# Patient Record
Sex: FEMALE | Race: BLACK OR AFRICAN AMERICAN | Employment: OTHER | URBAN - METROPOLITAN AREA
[De-identification: names, ages, dates, MRNs, and addresses within clinical notes are randomized per-mention and may not be internally consistent; named-entity substitution may affect disease eponyms.]

---

## 2018-03-05 ENCOUNTER — HOSPITAL ENCOUNTER (OUTPATIENT)
Facility: HOSPITAL | Age: 78
Setting detail: OBSERVATION
Discharge: HOME | End: 2018-03-08
Attending: EMERGENCY MEDICINE | Admitting: INTERNAL MEDICINE

## 2018-03-05 ENCOUNTER — APPOINTMENT (EMERGENCY)
Dept: RADIOLOGY | Facility: HOSPITAL | Age: 78
End: 2018-03-05

## 2018-03-05 DIAGNOSIS — R11.2 NAUSEA AND VOMITING, INTRACTABILITY OF VOMITING NOT SPECIFIED, UNSPECIFIED VOMITING TYPE: ICD-10-CM

## 2018-03-05 DIAGNOSIS — K44.9 HIATAL HERNIA: ICD-10-CM

## 2018-03-05 DIAGNOSIS — R94.31 ABNORMAL EKG: ICD-10-CM

## 2018-03-05 DIAGNOSIS — R07.9 CHEST PAIN, UNSPECIFIED TYPE: Primary | ICD-10-CM

## 2018-03-05 DIAGNOSIS — R10.84 GENERALIZED ABDOMINAL PAIN: ICD-10-CM

## 2018-03-05 DIAGNOSIS — R91.1 PULMONARY NODULE: ICD-10-CM

## 2018-03-05 LAB
ALBUMIN SERPL BCP-MCNC: 3.6 G/DL (ref 3.5–5)
ALP SERPL-CCNC: 85 U/L (ref 46–116)
ALT SERPL W P-5'-P-CCNC: 20 U/L (ref 12–78)
ANION GAP SERPL CALCULATED.3IONS-SCNC: 10 MMOL/L (ref 4–13)
ANISOCYTOSIS BLD QL SMEAR: PRESENT
APTT PPP: 27 SECONDS (ref 23–35)
AST SERPL W P-5'-P-CCNC: 21 U/L (ref 5–45)
BASOPHILS # BLD MANUAL: 0 THOUSAND/UL (ref 0–0.1)
BASOPHILS NFR MAR MANUAL: 0 % (ref 0–1)
BILIRUB SERPL-MCNC: 0.41 MG/DL (ref 0.2–1)
BUN SERPL-MCNC: 13 MG/DL (ref 5–25)
CALCIUM SERPL-MCNC: 8.8 MG/DL (ref 8.3–10.1)
CHLORIDE SERPL-SCNC: 102 MMOL/L (ref 100–108)
CO2 SERPL-SCNC: 28 MMOL/L (ref 21–32)
CREAT SERPL-MCNC: 1.17 MG/DL (ref 0.6–1.3)
EOSINOPHIL # BLD MANUAL: 0.42 THOUSAND/UL (ref 0–0.4)
EOSINOPHIL NFR BLD MANUAL: 4 % (ref 0–6)
ERYTHROCYTE [DISTWIDTH] IN BLOOD BY AUTOMATED COUNT: 18.3 % (ref 11.6–15.1)
GFR SERPL CREATININE-BSD FRML MDRD: 44 ML/MIN/1.73SQ M
GLUCOSE SERPL-MCNC: 190 MG/DL (ref 65–140)
HCT VFR BLD AUTO: 36.3 % (ref 34.8–46.1)
HGB BLD-MCNC: 11.8 G/DL (ref 11.5–15.4)
HYPERCHROMIA BLD QL SMEAR: PRESENT
INR PPP: 0.98 (ref 0.86–1.16)
LACTATE SERPL-SCNC: 2 MMOL/L (ref 0.5–2)
LIPASE SERPL-CCNC: 252 U/L (ref 73–393)
LYMPHOCYTES # BLD AUTO: 4.54 THOUSAND/UL (ref 0.6–4.47)
LYMPHOCYTES # BLD AUTO: 43 % (ref 14–44)
MAGNESIUM SERPL-MCNC: 2.2 MG/DL (ref 1.6–2.6)
MCH RBC QN AUTO: 23.6 PG (ref 26.8–34.3)
MCHC RBC AUTO-ENTMCNC: 32.5 G/DL (ref 31.4–37.4)
MCV RBC AUTO: 73 FL (ref 82–98)
MONOCYTES # BLD AUTO: 0.42 THOUSAND/UL (ref 0–1.22)
MONOCYTES NFR BLD: 4 % (ref 4–12)
NEUTROPHILS # BLD MANUAL: 4.85 THOUSAND/UL (ref 1.85–7.62)
NEUTS SEG NFR BLD AUTO: 46 % (ref 43–75)
PLATELET # BLD AUTO: 541 THOUSANDS/UL (ref 149–390)
PLATELET BLD QL SMEAR: ABNORMAL
PMV BLD AUTO: 9.7 FL (ref 8.9–12.7)
POIKILOCYTOSIS BLD QL SMEAR: PRESENT
POLYCHROMASIA BLD QL SMEAR: PRESENT
POTASSIUM SERPL-SCNC: 3.6 MMOL/L (ref 3.5–5.3)
PROT SERPL-MCNC: 7.8 G/DL (ref 6.4–8.2)
PROTHROMBIN TIME: 13 SECONDS (ref 12.1–14.4)
RBC # BLD AUTO: 4.99 MILLION/UL (ref 3.81–5.12)
SODIUM SERPL-SCNC: 140 MMOL/L (ref 136–145)
TOTAL CELLS COUNTED SPEC: 100
TROPONIN I SERPL-MCNC: <0.02 NG/ML
VARIANT LYMPHS # BLD AUTO: 3 %
WBC # BLD AUTO: 10.55 THOUSAND/UL (ref 4.31–10.16)

## 2018-03-05 PROCEDURE — 80053 COMPREHEN METABOLIC PANEL: CPT | Performed by: EMERGENCY MEDICINE

## 2018-03-05 PROCEDURE — 96361 HYDRATE IV INFUSION ADD-ON: CPT

## 2018-03-05 PROCEDURE — 85730 THROMBOPLASTIN TIME PARTIAL: CPT | Performed by: EMERGENCY MEDICINE

## 2018-03-05 PROCEDURE — 96375 TX/PRO/DX INJ NEW DRUG ADDON: CPT

## 2018-03-05 PROCEDURE — 71046 X-RAY EXAM CHEST 2 VIEWS: CPT

## 2018-03-05 PROCEDURE — 96374 THER/PROPH/DIAG INJ IV PUSH: CPT

## 2018-03-05 PROCEDURE — 83690 ASSAY OF LIPASE: CPT | Performed by: NURSE PRACTITIONER

## 2018-03-05 PROCEDURE — 85007 BL SMEAR W/DIFF WBC COUNT: CPT | Performed by: EMERGENCY MEDICINE

## 2018-03-05 PROCEDURE — 93005 ELECTROCARDIOGRAM TRACING: CPT

## 2018-03-05 PROCEDURE — 83605 ASSAY OF LACTIC ACID: CPT | Performed by: NURSE PRACTITIONER

## 2018-03-05 PROCEDURE — 81002 URINALYSIS NONAUTO W/O SCOPE: CPT | Performed by: NURSE PRACTITIONER

## 2018-03-05 PROCEDURE — 85027 COMPLETE CBC AUTOMATED: CPT | Performed by: EMERGENCY MEDICINE

## 2018-03-05 PROCEDURE — 36415 COLL VENOUS BLD VENIPUNCTURE: CPT

## 2018-03-05 PROCEDURE — 84484 ASSAY OF TROPONIN QUANT: CPT | Performed by: EMERGENCY MEDICINE

## 2018-03-05 PROCEDURE — 85610 PROTHROMBIN TIME: CPT | Performed by: EMERGENCY MEDICINE

## 2018-03-05 PROCEDURE — 83735 ASSAY OF MAGNESIUM: CPT | Performed by: NURSE PRACTITIONER

## 2018-03-05 RX ORDER — ONDANSETRON 2 MG/ML
4 INJECTION INTRAMUSCULAR; INTRAVENOUS ONCE
Status: COMPLETED | OUTPATIENT
Start: 2018-03-05 | End: 2018-03-05

## 2018-03-05 RX ORDER — SODIUM CHLORIDE 9 MG/ML
75 INJECTION, SOLUTION INTRAVENOUS CONTINUOUS
Status: DISCONTINUED | OUTPATIENT
Start: 2018-03-05 | End: 2018-03-07

## 2018-03-05 RX ORDER — MORPHINE SULFATE 2 MG/ML
2 INJECTION, SOLUTION INTRAMUSCULAR; INTRAVENOUS ONCE
Status: COMPLETED | OUTPATIENT
Start: 2018-03-05 | End: 2018-03-05

## 2018-03-05 RX ADMIN — ONDANSETRON 4 MG: 2 INJECTION INTRAMUSCULAR; INTRAVENOUS at 22:29

## 2018-03-05 RX ADMIN — MORPHINE SULFATE 2 MG: 2 INJECTION, SOLUTION INTRAMUSCULAR; INTRAVENOUS at 22:57

## 2018-03-05 RX ADMIN — SODIUM CHLORIDE 125 ML/HR: 0.9 INJECTION, SOLUTION INTRAVENOUS at 22:31

## 2018-03-06 ENCOUNTER — APPOINTMENT (EMERGENCY)
Dept: CT IMAGING | Facility: HOSPITAL | Age: 78
End: 2018-03-06

## 2018-03-06 ENCOUNTER — APPOINTMENT (OUTPATIENT)
Dept: ULTRASOUND IMAGING | Facility: HOSPITAL | Age: 78
End: 2018-03-06

## 2018-03-06 PROBLEM — R07.9 CHEST PAIN: Status: ACTIVE | Noted: 2018-03-06

## 2018-03-06 PROBLEM — R11.10 INTRACTABLE VOMITING: Status: ACTIVE | Noted: 2018-03-06

## 2018-03-06 LAB
ALBUMIN SERPL BCP-MCNC: 3 G/DL (ref 3.5–5)
ALP SERPL-CCNC: 73 U/L (ref 46–116)
ALT SERPL W P-5'-P-CCNC: 17 U/L (ref 12–78)
ANION GAP SERPL CALCULATED.3IONS-SCNC: 11 MMOL/L (ref 4–13)
AST SERPL W P-5'-P-CCNC: 16 U/L (ref 5–45)
ATRIAL RATE: 111 BPM
ATRIAL RATE: 81 BPM
ATRIAL RATE: 83 BPM
BASOPHILS # BLD AUTO: 0.02 THOUSANDS/ΜL (ref 0–0.1)
BASOPHILS NFR BLD AUTO: 0 % (ref 0–1)
BILIRUB SERPL-MCNC: 0.68 MG/DL (ref 0.2–1)
BILIRUB UR QL STRIP: NEGATIVE
BUN SERPL-MCNC: 7 MG/DL (ref 5–25)
CALCIUM SERPL-MCNC: 8 MG/DL (ref 8.3–10.1)
CHLORIDE SERPL-SCNC: 107 MMOL/L (ref 100–108)
CLARITY UR: CLEAR
CO2 SERPL-SCNC: 24 MMOL/L (ref 21–32)
COLOR UR: YELLOW
COLOR, POC: YELLOW
CREAT SERPL-MCNC: 1.06 MG/DL (ref 0.6–1.3)
EOSINOPHIL # BLD AUTO: 0.22 THOUSAND/ΜL (ref 0–0.61)
EOSINOPHIL NFR BLD AUTO: 3 % (ref 0–6)
ERYTHROCYTE [DISTWIDTH] IN BLOOD BY AUTOMATED COUNT: 17.8 % (ref 11.6–15.1)
GFR SERPL CREATININE-BSD FRML MDRD: 57 ML/MIN/1.73SQ M
GLUCOSE SERPL-MCNC: 156 MG/DL (ref 65–140)
GLUCOSE UR STRIP-MCNC: NEGATIVE MG/DL
HCT VFR BLD AUTO: 33.8 % (ref 34.8–46.1)
HGB BLD-MCNC: 10.7 G/DL (ref 11.5–15.4)
HGB UR QL STRIP.AUTO: NEGATIVE
KETONES UR STRIP-MCNC: NEGATIVE MG/DL
LEUKOCYTE ESTERASE UR QL STRIP: NEGATIVE
LYMPHOCYTES # BLD AUTO: 3.13 THOUSANDS/ΜL (ref 0.6–4.47)
LYMPHOCYTES NFR BLD AUTO: 41 % (ref 14–44)
MCH RBC QN AUTO: 23.7 PG (ref 26.8–34.3)
MCHC RBC AUTO-ENTMCNC: 31.7 G/DL (ref 31.4–37.4)
MCV RBC AUTO: 75 FL (ref 82–98)
MONOCYTES # BLD AUTO: 0.64 THOUSAND/ΜL (ref 0.17–1.22)
MONOCYTES NFR BLD AUTO: 8 % (ref 4–12)
NEUTROPHILS # BLD AUTO: 3.63 THOUSANDS/ΜL (ref 1.85–7.62)
NEUTS SEG NFR BLD AUTO: 48 % (ref 43–75)
NITRITE UR QL STRIP: NEGATIVE
NRBC BLD AUTO-RTO: 0 /100 WBCS
P AXIS: 54 DEGREES
P AXIS: 58 DEGREES
P AXIS: 69 DEGREES
PH UR STRIP.AUTO: 6 [PH] (ref 4.5–8)
PLATELET # BLD AUTO: 424 THOUSANDS/UL (ref 149–390)
PMV BLD AUTO: 9.7 FL (ref 8.9–12.7)
POTASSIUM SERPL-SCNC: 3.7 MMOL/L (ref 3.5–5.3)
PR INTERVAL: 162 MS
PR INTERVAL: 168 MS
PR INTERVAL: 178 MS
PROT SERPL-MCNC: 6.6 G/DL (ref 6.4–8.2)
PROT UR STRIP-MCNC: NEGATIVE MG/DL
QRS AXIS: -52 DEGREES
QRS AXIS: -72 DEGREES
QRS AXIS: -73 DEGREES
QRSD INTERVAL: 136 MS
QRSD INTERVAL: 138 MS
QRSD INTERVAL: 140 MS
QT INTERVAL: 340 MS
QT INTERVAL: 394 MS
QT INTERVAL: 404 MS
QTC INTERVAL: 457 MS
QTC INTERVAL: 462 MS
QTC INTERVAL: 474 MS
RBC # BLD AUTO: 4.52 MILLION/UL (ref 3.81–5.12)
SODIUM SERPL-SCNC: 142 MMOL/L (ref 136–145)
SP GR UR STRIP.AUTO: 1.01 (ref 1–1.03)
T WAVE AXIS: 21 DEGREES
T WAVE AXIS: 30 DEGREES
T WAVE AXIS: 45 DEGREES
TROPONIN I SERPL-MCNC: <0.02 NG/ML
TROPONIN I SERPL-MCNC: <0.02 NG/ML
UROBILINOGEN UR QL STRIP.AUTO: 1 E.U./DL
VENTRICULAR RATE: 111 BPM
VENTRICULAR RATE: 81 BPM
VENTRICULAR RATE: 83 BPM
WBC # BLD AUTO: 7.64 THOUSAND/UL (ref 4.31–10.16)

## 2018-03-06 PROCEDURE — 74176 CT ABD & PELVIS W/O CONTRAST: CPT

## 2018-03-06 PROCEDURE — 99220 PR INITIAL OBSERVATION CARE/DAY 70 MINUTES: CPT | Performed by: INTERNAL MEDICINE

## 2018-03-06 PROCEDURE — C9113 INJ PANTOPRAZOLE SODIUM, VIA: HCPCS | Performed by: NURSE PRACTITIONER

## 2018-03-06 PROCEDURE — 81003 URINALYSIS AUTO W/O SCOPE: CPT

## 2018-03-06 PROCEDURE — 84484 ASSAY OF TROPONIN QUANT: CPT | Performed by: NURSE PRACTITIONER

## 2018-03-06 PROCEDURE — 80053 COMPREHEN METABOLIC PANEL: CPT | Performed by: INTERNAL MEDICINE

## 2018-03-06 PROCEDURE — 99255 IP/OBS CONSLTJ NEW/EST HI 80: CPT | Performed by: INTERNAL MEDICINE

## 2018-03-06 PROCEDURE — 85025 COMPLETE CBC W/AUTO DIFF WBC: CPT | Performed by: INTERNAL MEDICINE

## 2018-03-06 PROCEDURE — 76705 ECHO EXAM OF ABDOMEN: CPT

## 2018-03-06 PROCEDURE — 71250 CT THORAX DX C-: CPT

## 2018-03-06 PROCEDURE — 84484 ASSAY OF TROPONIN QUANT: CPT | Performed by: INTERNAL MEDICINE

## 2018-03-06 PROCEDURE — 93010 ELECTROCARDIOGRAM REPORT: CPT | Performed by: INTERNAL MEDICINE

## 2018-03-06 PROCEDURE — 96361 HYDRATE IV INFUSION ADD-ON: CPT

## 2018-03-06 PROCEDURE — 99214 OFFICE O/P EST MOD 30 MIN: CPT | Performed by: INTERNAL MEDICINE

## 2018-03-06 PROCEDURE — 93005 ELECTROCARDIOGRAM TRACING: CPT

## 2018-03-06 PROCEDURE — 36415 COLL VENOUS BLD VENIPUNCTURE: CPT | Performed by: NURSE PRACTITIONER

## 2018-03-06 PROCEDURE — 99285 EMERGENCY DEPT VISIT HI MDM: CPT

## 2018-03-06 PROCEDURE — 96375 TX/PRO/DX INJ NEW DRUG ADDON: CPT

## 2018-03-06 RX ORDER — ASPIRIN 81 MG/1
81 TABLET ORAL DAILY
Status: DISCONTINUED | OUTPATIENT
Start: 2018-03-06 | End: 2018-03-06

## 2018-03-06 RX ORDER — PANTOPRAZOLE SODIUM 40 MG/1
40 INJECTION, POWDER, FOR SOLUTION INTRAVENOUS ONCE
Status: COMPLETED | OUTPATIENT
Start: 2018-03-06 | End: 2018-03-06

## 2018-03-06 RX ORDER — ONDANSETRON 2 MG/ML
4 INJECTION INTRAMUSCULAR; INTRAVENOUS EVERY 4 HOURS PRN
Status: DISCONTINUED | OUTPATIENT
Start: 2018-03-06 | End: 2018-03-08 | Stop reason: HOSPADM

## 2018-03-06 RX ORDER — PANTOPRAZOLE SODIUM 40 MG/1
40 TABLET, DELAYED RELEASE ORAL DAILY
COMMUNITY
End: 2018-03-08 | Stop reason: HOSPADM

## 2018-03-06 RX ORDER — ACETAMINOPHEN 325 MG/1
650 TABLET ORAL EVERY 6 HOURS PRN
Status: DISCONTINUED | OUTPATIENT
Start: 2018-03-06 | End: 2018-03-08 | Stop reason: HOSPADM

## 2018-03-06 RX ORDER — AMLODIPINE BESYLATE 10 MG/1
10 TABLET ORAL DAILY
COMMUNITY

## 2018-03-06 RX ORDER — POLYETHYLENE GLYCOL 3350 17 G/17G
17 POWDER, FOR SOLUTION ORAL DAILY
Status: DISCONTINUED | OUTPATIENT
Start: 2018-03-06 | End: 2018-03-08 | Stop reason: HOSPADM

## 2018-03-06 RX ORDER — LOSARTAN POTASSIUM 50 MG/1
25 TABLET ORAL DAILY
COMMUNITY

## 2018-03-06 RX ORDER — PANTOPRAZOLE SODIUM 40 MG/1
40 INJECTION, POWDER, FOR SOLUTION INTRAVENOUS
Status: DISCONTINUED | OUTPATIENT
Start: 2018-03-06 | End: 2018-03-06

## 2018-03-06 RX ORDER — AMLODIPINE BESYLATE 10 MG/1
10 TABLET ORAL DAILY
Status: DISCONTINUED | OUTPATIENT
Start: 2018-03-06 | End: 2018-03-08 | Stop reason: HOSPADM

## 2018-03-06 RX ORDER — OMEPRAZOLE 20 MG/1
20 CAPSULE, DELAYED RELEASE ORAL DAILY
COMMUNITY

## 2018-03-06 RX ADMIN — FAMOTIDINE 20 MG: 10 INJECTION, SOLUTION INTRAVENOUS at 02:20

## 2018-03-06 RX ADMIN — FAMOTIDINE 20 MG: 10 INJECTION, SOLUTION INTRAVENOUS at 20:01

## 2018-03-06 RX ADMIN — FAMOTIDINE 20 MG: 10 INJECTION, SOLUTION INTRAVENOUS at 09:59

## 2018-03-06 RX ADMIN — AMLODIPINE BESYLATE 10 MG: 10 TABLET ORAL at 09:59

## 2018-03-06 RX ADMIN — ASPIRIN 81 MG: 81 TABLET, COATED ORAL at 09:59

## 2018-03-06 RX ADMIN — SODIUM CHLORIDE 75 ML/HR: 0.9 INJECTION, SOLUTION INTRAVENOUS at 21:27

## 2018-03-06 RX ADMIN — ENOXAPARIN SODIUM 40 MG: 40 INJECTION SUBCUTANEOUS at 09:59

## 2018-03-06 RX ADMIN — SODIUM CHLORIDE 125 ML/HR: 0.9 INJECTION, SOLUTION INTRAVENOUS at 10:00

## 2018-03-06 RX ADMIN — PANTOPRAZOLE SODIUM 40 MG: 40 INJECTION, POWDER, FOR SOLUTION INTRAVENOUS at 01:07

## 2018-03-06 NOTE — PLAN OF CARE

## 2018-03-06 NOTE — SOCIAL WORK
CM spoke with pt's daughter via phone to discuss discharge needs  Pt is from [employer] visiting family  Daughter states whenever she wants to go back she can  ADL's are completed independently with no DME use  Pt does not have a PCP or pharmacy here  Pt's daughter has been providing transportation for her while she's here  Pt does not have a POA  No needs at present  CM following as needed

## 2018-03-06 NOTE — H&P
History and Physical - [facility] Internal Medicine    Patient Information: Nabil Carmen [de-identified] y o  female MRN: [id number]  Unit/Bed#: ED 10 Encounter: [id number]  Admitting Physician: Josh Vazquez MD  PCP: No primary care provider on file  Date of Admission:  03/06/18        Chief Complaint:   Abdominal pain and vomiting    History of Present Illness:    Nabil Carmen is a [de-identified] y o  female with past medical history of GERD and hypertension presenting to the ER with abdominal pain and vomiting  Patient is very poor historian, she has Creol speaking even with interpretation she is unable to give clear history  She stated that she is having pain all over her body including her abdomen chest and arms  She had been vomiting but she cannot enumerate how many times or when the vomiting started  She stated that she has been having on and off diarrhea  She did not recognize any fevers  Denies any chest pain, SOB, coughing or palpitations  I could not obtain any further review of systems due to language barrier    Review of Systems:  Review of systems as above, could not obtain any further review of systems due to language barrier    Past Medical and Surgical History:     Past Medical History:   Diagnosis Date    GERD (gastroesophageal reflux disease)     Hypertension        Past Surgical History:   Procedure Laterality Date    NO PAST SURGERIES         Meds/Allergies:    Prior to Admission medications    Medication Sig Start Date End Date Taking? Authorizing Provider   amLODIPine (NORVASC) 10 mg tablet Take 10 mg by mouth daily   Yes Historical Provider, MD     I have reviewed home medications with a medical source (PCP, Pharmacy, other)      Allergies: No Known Allergies    Social History:     Marital Status: Single     History   Alcohol Use No     History   Smoking Status    Never Smoker   Smokeless Tobacco    Never Used     History   Drug Use No       Family History:  Asked and noncontributory    Physical Exam:     Vitals:   Blood Pressure: 129/68 (03/06/18 0107)  Pulse: 77 (03/06/18 0107)  Temperature: 98 2 °F (36 8 °C) (03/05/18 2151)  Temp Source: Temporal (03/05/18 2151)  Respirations: 20 (03/06/18 0107)  SpO2: 97 % (03/06/18 0107)    General: Alert , Ox3  Head: Normocephalic atraumatic  Eyes: SHILPA  Anicteric sclera  H&N: Supple neck  No palpable lymphadenopathy  Chest CTA BL  No wheezing or crackles  Heart: S1, S2 RRR, no murmur  Abd: soft, epigastric tenderness with no rebound tenderness, non distended  Extremities: No oedema  No clubbing or cyanosis  Skin: Intact, no rash  Neuro: Moving all 4 extremities  Additional Data:     Lab Results: I have personally reviewed pertinent reports  Results from last 7 days  Lab Units 03/05/18  2204   WBC Thousand/uL 10 55*   HEMOGLOBIN g/dL 11 8   HEMATOCRIT % 36 3   PLATELETS Thousands/uL 541*   LYMPHO PCT % 43   MONO PCT MAN % 4   EOSINO PCT MANUAL % 4       Results from last 7 days  Lab Units 03/05/18  2204   SODIUM mmol/L 140   POTASSIUM mmol/L 3 6   CHLORIDE mmol/L 102   CO2 mmol/L 28   BUN mg/dL 13   CREATININE mg/dL 1 17   CALCIUM mg/dL 8 8   TOTAL PROTEIN g/dL 7 8   BILIRUBIN TOTAL mg/dL 0 41   ALK PHOS U/L 85   ALT U/L 20   AST U/L 21   GLUCOSE RANDOM mg/dL 190*       Results from last 7 days  Lab Units 03/05/18  2204   INR  0 98       Imaging: I have personally reviewed pertinent reports  Ct Chest Abdomen Pelvis Wo Contrast    Result Date: 3/6/2018  Narrative: CT CHEST, ABDOMEN AND PELVIS WITHOUT IV CONTRAST INDICATION: Abdominal pain, nausea vomiting and diarrhea  COMPARISON: 3/5/2018  TECHNIQUE: CT examination of the chest, abdomen and pelvis was performed without intravenous contrast   Axial, sagittal, and coronal 2D reformatted images were created from the source data and submitted for interpretation  Radiation dose length product (DLP) for this visit:  1468 mGy-cm     This examination, like all CT scans performed in the [facility], was performed utilizing techniques to minimize radiation dose exposure, including the use of iterative reconstruction and automated exposure control  Enteric contrast was administered  FINDINGS: CHEST LUNGS:  There is a 2 mm subpleural lesion in the right middle lobe, adjacent to the fissure on series 3 image 23  A 3 5 mm pulmonary nodule is present in the right middle lobe on series 3 image 28  There is no tracheal or endobronchial lesion  PLEURA:  Unremarkable  HEART/GREAT VESSELS:  Mild cardiomegaly  No thoracic aortic aneurysm  MEDIASTINUM AND JAN:  Moderate to large hiatal hernia  No lymphadenopathy  CHEST WALL AND LOWER NECK:   Unremarkable  ABDOMEN LIVER/BILIARY TREE:  Unremarkable  GALLBLADDER:  Stones or gallbladder  No evidence of cholecystitis    SPLEEN:  Unremarkable  PANCREAS:  Unremarkable  ADRENAL GLANDS:  Unremarkable  KIDNEYS/URETERS:  No nephrolithiasis or obstructive uropathy  STOMACH AND BOWEL:  Fat-containing 2 cm periumbilical hernia  No bowel obstruction, colitis or diverticulitis  APPENDIX:  No findings to suggest appendicitis  ABDOMINOPELVIC CAVITY:  No ascites or free intraperitoneal air  No lymphadenopathy  VESSELS:  Unremarkable for patient's age  PELVIS REPRODUCTIVE ORGANS:  Unremarkable for patient's age  URINARY BLADDER:  Unremarkable  ABDOMINAL WALL/INGUINAL REGIONS:  Unremarkable  OSSEOUS STRUCTURES:  No acute fracture or destructive osseous lesion  Impression: 1  Moderate to large hiatal hernia  No bowel obstruction, colitis or diverticulitis  2   Stone filled gallbladder without evidence of acute cholecystitis  No biliary obstruction  3   Pulmonary nodules in the right middle lobe measuring 2 and 3 5 cm Based on current Fleischner Society 2017 Guidelines on incidental pulmonary nodule, no routine follow-up is needed if the patient is considered low risk for lung cancer    If the patient is considered high risk for lung cancer, 12 month follow-up non-contrast chest CT is recommended  Workstation performed: [id number]       Assessment/Plan:    Hospital Problem List:     Principal Problem:    Intractable vomiting  Active Problems:    Chest pain      Plan for the Primary Problem(s):  Intractable vomiting  Patient with epigastric tenderness suggesting gastritis  Will keep patient on clear liquid diet  Will start her on IV Pepcid and p r n  IV Zofran  Continue Protonix  EKG shows sinus rhythm with bifascicular block  Will consult GI to evaluate for possible EGD    Chest pain  Will keep patient tele monitor  Trend troponins  Check 2D echo  Start patient on aspirin empirically  Cardiology consultation for further recommendations  Hypertension  Will continue Norvasc    VTE Prophylaxis: Enoxaparin (Lovenox)  / sequential compression device   Code Status: fc  POLST: POLST form is not discussed and not completed at this time  Anticipated Length of Stay:  Patient will be admitted on an Observation basis with an anticipated length of stay of  <2 midnights  Justification for Hospital Stay:  Intractable vomiting and workup for chest pain    Total Time for Visit, including Counseling / Coordination of Care: 30 minutes  Greater than 50% of this total time spent on direct patient counseling and coordination of care  ** Please Note: Dragon 360 Dictation voice to text software may have been used in the creation of this document   **

## 2018-03-06 NOTE — PROGRESS NOTES
Cardiology Consult  03/06/18    Referring Physian: DO SHOSHANA Malone    Chief Complain/Reason for Referal:     IMPRESSION:  1  Atypical chest pain, reproducible  2  Abdominal pain and vomiting  3  Bifascicular block  4  Hypertension        DISCUSSION/RECOMMENDATIONS:  · Chest pain clearly reproducible with palpation, may be musculoskeletal etiology secondary to intractable vomiting  No ischemic ECG changes, although nonspecific T-wave abnormalities present in the setting of bifascicular block  Troponins unremarkable  Recommend finishing of balance of cardiac enzymes, echocardiogram to evaluate cardiac structure and function  If unremarkable, would not pursue further inpatient cardiac testing  If echocardiogram unremarkable, we will see as needed from here on     ======================================================    HPI:  I am seeing this patient in cardiology consultation for:  Chest pain    Isaias Castellanos is a [de-identified] y o  female who presented to the ER last night with complaints of intractable nausea and vomiting  CT abdomen revealed a large hiatal hernia  She also complains of chest pain, which triggered a cardiology consultation  Troponin levels have been unremarkable since her stay here with no ischemic ECG changes  She is unable to clarify her chest pain in detail, but seems to be clearly reproducible with changes in position while in bed and with palpation          Past Medical History:   Diagnosis Date    GERD (gastroesophageal reflux disease)     Hypertension          Scheduled Meds:  Current Facility-Administered Medications:  acetaminophen 650 mg Oral Q6H PRN Irineo Harrell MD    amLODIPine 10 mg Oral Daily Irineo Harrell MD    aspirin 81 mg Oral Daily Irineo Harrell MD    enoxaparin 40 mg Subcutaneous Daily Irineo Harrell MD    famotidine 20 mg Intravenous Q12H [address] Irineo Harrell MD    ondansetron 4 mg Intravenous Q4H PRN Irineo Harrell MD    polyethylene glycol 17 g Oral Daily Irineo MD Sharri    sodium chloride 125 mL/hr Intravenous Continuous Carolina JACK Powers Last Rate: 125 mL/hr (03/05/18 2231)     Continuous Infusions:  sodium chloride 125 mL/hr Last Rate: 125 mL/hr (03/05/18 2231)     PRN Meds:   acetaminophen    ondansetron  No Known Allergies  I reviewed the Home Medication list in the chart  History reviewed  No pertinent family history  Social History     Social History    Marital status: Single     Spouse name: N/A    Number of children: N/A    Years of education: N/A     Occupational History    Not on file  Social History Main Topics    Smoking status: Never Smoker    Smokeless tobacco: Never Used    Alcohol use No    Drug use: No    Sexual activity: Not on file     Other Topics Concern    Not on file     Social History Narrative    No narrative on file       Review of Systems - as per HPI, all others reviewed and negative  Vitals:    03/06/18 0755   BP: 118/64   Pulse: 75   Resp: 19   Temp: 98 6 °F (37 °C)   SpO2: 97%     I/O     None        Weight (last 2 days)     None          GEN: NAD, Alert  HEENT: Mucus membranes moist, pink conjunctivae  EYES: Pupils equal, sclera anicteric  NECK: No JVD/HJR, no carotid bruit  CARDIOVASCULAR: RRR, No murmur, rub, gallops S1,S2, no parasternal heave/thrill  LUNGS: Clear To auscultation bilaterally  ABDOMEN: Soft, nondistended, no hepatic systolic pulsation  EXTREMITIES/VASCULAR: No edema    PSYCH: Normal Affect by limited examination  NEURO: Grossly intact by limited examination    HEME: No significant bleeding, bruising, petechia by limited examination  SKIN: No significant rashes by limited examination  MSK:  Normal upper extremity and trunk strengths by limited examination      EKG:  Sinus rhythm, bifascicular block  TELE:  Sinus rhythm, no events      Results from last 7 days  Lab Units 03/05/18  2204   WBC Thousand/uL 10 55*   HEMOGLOBIN g/dL 11 8   HEMATOCRIT % 36 3   PLATELETS Thousands/uL 541*   MONO PCT MAN % 4       Results from last 7 days  Lab Units 03/05/18  2204   SODIUM mmol/L 140   POTASSIUM mmol/L 3 6   CHLORIDE mmol/L 102   CO2 mmol/L 28   BUN mg/dL 13   CREATININE mg/dL 1 17   GLUCOSE RANDOM mg/dL 190*   CALCIUM mg/dL 8 8       Results from last 7 days  Lab Units 03/05/18  2204   SODIUM mmol/L 140   POTASSIUM mmol/L 3 6   CHLORIDE mmol/L 102   CO2 mmol/L 28   BUN mg/dL 13   CREATININE mg/dL 1 17   CALCIUM mg/dL 8 8   TOTAL PROTEIN g/dL 7 8   BILIRUBIN TOTAL mg/dL 0 41   ALK PHOS U/L 85   ALT U/L 20   AST U/L 21   GLUCOSE RANDOM mg/dL 190*     No results found for: TROPONINT        Results from last 7 days  Lab Units 03/06/18  0400   TROPONIN I ng/mL <0 02           Results from last 7 days  Lab Units 03/05/18  2204   INR  0 98               I have personally reviewed the EKG, CXR and Telemetry images directly  Patient Active Problem List    Diagnosis Date Noted    Chest pain 03/06/2018     Priority: Low    Intractable vomiting 03/06/2018     Priority: Low       Portions of the record may have been created with voice recognition software  Occasional wrong word or "sound a like" substitutions may have occurred due to the inherent limitations of voice recognition software  Read the chart carefully and recognize, using context, where substitutions have occurred

## 2018-03-06 NOTE — ED PROVIDER NOTES
History  Chief Complaint   Patient presents with    Vomiting     vomiting since yest, reports upper chest pain and sob  This is an [de-identified]year old [address] women who speaks no English and through family member states she started with nausea, vomiting, abdominal pain, chest pain last night  Pt has had intermittent diarrhea  She was taking zofran but stopped  Family member denies that pt has had any abdominal surgeries  History provided by:  Patient and medical records  History limited by: culture    used: Yes        Prior to Admission Medications   Prescriptions Last Dose Informant Patient Reported? Taking? amLODIPine (NORVASC) 10 mg tablet   Yes Yes   Sig: Take 10 mg by mouth daily   cimetidine (TAGAMET) 200 mg tablet   Yes Yes   Sig: Take 200 mg by mouth 4 (four) times a day   losartan (COZAAR) 50 mg tablet   Yes Yes   Sig: Take 25 mg by mouth daily   omeprazole (PriLOSEC) 20 mg delayed release capsule   Yes Yes   Sig: Take 20 mg by mouth daily   pantoprazole (PROTONIX) 40 mg tablet   Yes Yes   Sig: Take 40 mg by mouth daily      Facility-Administered Medications: None       Past Medical History:   Diagnosis Date    GERD (gastroesophageal reflux disease)     Hypertension        Past Surgical History:   Procedure Laterality Date    NO PAST SURGERIES         History reviewed  No pertinent family history  I have reviewed and agree with the history as documented  Social History   Substance Use Topics    Smoking status: Never Smoker    Smokeless tobacco: Never Used    Alcohol use No        Review of Systems   Constitutional: Negative  HENT: Negative  Eyes: Negative  Respiratory: Negative  Cardiovascular: Positive for chest pain  Gastrointestinal: Positive for abdominal pain, diarrhea, nausea and vomiting  Endocrine: Negative  Genitourinary: Negative  Musculoskeletal: Negative  Skin: Negative  Allergic/Immunologic: Negative      Neurological: Negative  Hematological: Negative  Psychiatric/Behavioral: Negative  Physical Exam  ED Triage Vitals   Temperature Pulse Respirations Blood Pressure SpO2   03/05/18 2151 03/05/18 2151 03/05/18 2151 03/05/18 2151 03/05/18 2151   98 2 °F (36 8 °C) (!) 122 (!) 24 140/65 98 %      Temp Source Heart Rate Source Patient Position - Orthostatic VS BP Location FiO2 (%)   03/05/18 2151 03/05/18 2242 03/05/18 2151 03/05/18 2151 --   Temporal Monitor Lying Left arm       Pain Score       03/05/18 2151       Worst Possible Pain           Orthostatic Vital Signs  Vitals:    03/05/18 2151 03/05/18 2242 03/05/18 2355 03/06/18 0107   BP: 140/65 129/72 126/73 129/68   Pulse: (!) 122 98 85 77   Patient Position - Orthostatic VS: Lying Sitting Lying Lying       Physical Exam   Constitutional: She is oriented to person, place, and time  She appears well-developed and well-nourished  She appears distressed  HENT:   Head: Normocephalic and atraumatic  Eyes: EOM are normal  Pupils are equal, round, and reactive to light  Neck: Normal range of motion  Neck supple  Cardiovascular: Normal rate, regular rhythm and normal heart sounds  Pulmonary/Chest: Effort normal and breath sounds normal    Abdominal: Soft  Bowel sounds are normal  She exhibits no distension  There is tenderness  Musculoskeletal: Normal range of motion  Neurological: She is alert and oriented to person, place, and time  Skin: Skin is warm and dry  Capillary refill takes less than 2 seconds  She is not diaphoretic  Psychiatric: She has a normal mood and affect  Her behavior is normal  Judgment and thought content normal    Nursing note and vitals reviewed        ED Medications  Medications   sodium chloride 0 9 % infusion (125 mL/hr Intravenous New Bag 3/5/18 2231)   aspirin (ECOTRIN LOW STRENGTH) EC tablet 81 mg (not administered)   famotidine (PEPCID) injection 20 mg (20 mg Intravenous Given 3/6/18 0220)   ondansetron (ZOFRAN) injection 4 mg (4 mg Intravenous Given 3/5/18 2229)   morphine injection 2 mg (2 mg Intravenous Given 3/5/18 2257)   pantoprazole (PROTONIX) injection 40 mg (40 mg Intravenous Given 3/6/18 0107)       Diagnostic Studies  Results Reviewed     Procedure Component Value Units Date/Time    Troponin I [[id number]]     Lab Status:  No result Specimen:  Blood     Troponin I [[id number]]     Lab Status:  No result Specimen:  Blood     Troponin I [[id number]]  (Normal) Collected:  03/06/18 0101    Lab Status:  Final result Specimen:  Blood from Arm, Right Updated:  03/06/18 0124     Troponin I <0 02 ng/mL     Narrative:         Siemens Chemistry analyzer 99% cutoff is > 0 04 ng/mL in network labs    o cTnI 99% cutoff is useful only when applied to patients in the clinical setting of myocardial ischemia  o cTnI 99% cutoff should be interpreted in the context of clinical history, ECG findings and possibly cardiac imaging to establish correct diagnosis  o cTnI 99% cutoff may be suggestive but clearly not indicative of a coronary event without the clinical setting of myocardial ischemia      POCT urinalysis dipstick [[id number]]  (Normal) Resulted:  03/06/18 0016    Lab Status:  Final result Specimen:  Urine Updated:  03/06/18 0016     Color, UA yellow    ED Urine Macroscopic [[id number]]  (Normal) Collected:  03/06/18 0017    Lab Status:  Final result Specimen:  Urine Updated:  03/06/18 0016     Color, UA Yellow     Clarity, UA Clear     pH, UA 6 0     Leukocytes, UA Negative     Nitrite, UA Negative     Protein, UA Negative mg/dl      Glucose, UA Negative mg/dl      Ketones, UA Negative mg/dl      Urobilinogen, UA 1 0 E U /dl      Bilirubin, UA Negative     Blood, UA Negative     Specific Gravity, UA 1 010    Narrative:       CLINITEK RESULT    Magnesium [[id number]]  (Normal) Collected:  03/05/18 2204    Lab Status:  Final result Specimen:  Blood from Arm, Right Updated:  03/05/18 2316     Magnesium 2 2 mg/dL     Lipase [[id number]]  (Normal) Collected: 03/05/18 2204    Lab Status:  Final result Specimen:  Blood from Arm, Right Updated:  03/05/18 2316     Lipase 252 u/L     CBC and differential [[id number]]  (Abnormal) Collected:  03/05/18 2204    Lab Status:  Final result Specimen:  Blood from Arm, Right Updated:  03/05/18 2259     WBC 10 55 (H) Thousand/uL      RBC 4 99 Million/uL      Hemoglobin 11 8 g/dL      Hematocrit 36 3 %      MCV 73 (L) fL      MCH 23 6 (L) pg      MCHC 32 5 g/dL      RDW 18 3 (H) %      MPV 9 7 fL      Platelets 897 (H) Thousands/uL     Lactic acid, plasma [[id number]]  (Normal) Collected:  03/05/18 2231    Lab Status:  Final result Specimen:  Blood from Arm, Right Updated:  03/05/18 2257     LACTIC ACID 2 0 mmol/L     Narrative:         Result may be elevated if tourniquet was used during collection  Troponin I [[id number]]  (Normal) Collected:  03/05/18 2204    Lab Status:  Final result Specimen:  Blood from Arm, Right Updated:  03/05/18 2234     Troponin I <0 02 ng/mL     Narrative:         Siemens Chemistry analyzer 99% cutoff is > 0 04 ng/mL in network labs    o cTnI 99% cutoff is useful only when applied to patients in the clinical setting of myocardial ischemia  o cTnI 99% cutoff should be interpreted in the context of clinical history, ECG findings and possibly cardiac imaging to establish correct diagnosis  o cTnI 99% cutoff may be suggestive but clearly not indicative of a coronary event without the clinical setting of myocardial ischemia      Comprehensive metabolic panel [[id number]]  (Abnormal) Collected:  03/05/18 2204    Lab Status:  Final result Specimen:  Blood from Arm, Right Updated:  03/05/18 2232     Sodium 140 mmol/L      Potassium 3 6 mmol/L      Chloride 102 mmol/L      CO2 28 mmol/L      Anion Gap 10 mmol/L      BUN 13 mg/dL      Creatinine 1 17 mg/dL      Glucose 190 (H) mg/dL      Calcium 8 8 mg/dL      AST 21 U/L      ALT 20 U/L      Alkaline Phosphatase 85 U/L      Total Protein 7 8 g/dL      Albumin 3 6 g/dL Total Bilirubin 0 41 mg/dL      eGFR 44 ml/min/1 73sq m     Narrative:         National Kidney Disease Education Program recommendations are as follows:  GFR calculation is accurate only with a steady state creatinine  Chronic Kidney disease less than 60 ml/min/1 73 sq  meters  Kidney failure less than 15 ml/min/1 73 sq  meters  Josef Pulido [[id number]]  (Normal) Collected:  03/05/18 2204    Lab Status:  Final result Specimen:  Blood from Arm, Right Updated:  03/05/18 2229     Protime 13 0 seconds      INR 0 98    APTT [[id number]]  (Normal) Collected:  03/05/18 2204    Lab Status:  Final result Specimen:  Blood from Arm, Right Updated:  03/05/18 2229     PTT 27 seconds     Narrative: Therapeutic Heparin Range = 60-90 seconds                 X-ray chest 2 views   ED Interpretation by JACK Matos (03/06 0142)   Preliminary reading   Cardiomegaly   Waiting for rad read       CT chest abdomen pelvis wo contrast   Final Result by Rex Chicas MD (03/06 0042)         1  Moderate to large hiatal hernia  No bowel obstruction, colitis or diverticulitis  2   Stone filled gallbladder without evidence of acute cholecystitis  No biliary obstruction  3   Pulmonary nodules in the right middle lobe measuring 2 and 3 5 cm Based on current Fleischner Society 2017 Guidelines on incidental pulmonary nodule, no routine follow-up is needed if the patient is considered low risk for lung cancer  If the    patient is considered high risk for lung cancer, 12 month follow-up non-contrast chest CT is recommended                    Workstation performed: [id number]                    Procedures  ECG 12 Lead Documentation  Date/Time: 3/5/2018 10:28 PM  Performed by: Tonie Garcia  Authorized by: Tonie Garcia     Indications / Diagnosis:  Chest pain, abdominal pain   ECG reviewed by me, the ED Provider: yes (Dr Ira Marquez )    Patient location:  ED and bedside  Previous ECG:     Previous ECG:  Unavailable Comparison to cardiac monitor: Yes    Interpretation:     Interpretation: abnormal    Rate:     ECG rate:  111    ECG rate assessment: tachycardic    Rhythm:     Rhythm: sinus tachycardia and other rhythm      ECG 12 Lead Documentation  Date/Time: 3/6/2018 1:07 AM  Performed by: Maddy Snow  Authorized by: Leonora Mauro     Indications / Diagnosis:  Chest pain   ECG reviewed by me, the ED Provider: yes (Dr Jacky Cabot )    Patient location:  ED and bedside  Previous ECG:     Previous ECG:  Compared to current    Similarity:  No change    Comparison to cardiac monitor: Yes    Interpretation:     Interpretation: abnormal    Rate:     ECG rate:  81  RBBB LAFB     ECG rate assessment: normal    Rhythm:     Rhythm: sinus rhythm and other rhythm             Phone Contacts  ED Phone Contact    ED Course  ED Course as of Mar 06 0346   Tue Mar 06, 2018   0015 Labs reviewed and discussed with pt and family member  Pt appears to be improved, she was up walking to the bathroom and in no acute distress  0050 CT A/P  Impression:  1   Moderate to large hiatal hernia   No bowel obstruction, colitis or diverticulitis  2   Stone filled gallbladder without evidence of acute cholecystitis   No biliary obstruction  3   Pulmonary nodules in the right middle lobe measuring 2 and 3 5 cm Based on current Fleischner Society 2017 Guidelines on incidental pulmonary nodule, no routine follow-up is needed if the patient is considered low risk for lung cancer   If the   patient is considered high risk for lung cancer, 12 month follow-up non-contrast chest CT is recommended  0050 Will start pt on omeprazole due to [place] as pt pain appears to be related to that  Will repeat trop and EKG at 0100 to rule out cardiac pathology  56 Son has left and pt does not understand [place]  Will call son as he left his number, will call after Trop and EKG  0125 Trop#2 < 0 02  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, LAFB   Pt has no follow up and no cardiologist  Discussed with my attending Dr Tammie Lau who states pt should be admitted to [facility] with cardiology consult  Will call [facility]       0052 Spoke with Dr Hanh Lei who states he does not feel pt meets admission criteria  I explain I have spoken with my attending and with abnormal EKG pt may benefit from Cardiology consult and Echo  Dr Hanh Lei states "have your attending call me if he has concerns"  5027 Dr Tammie Lau spoke with Dr Hanh Lei after reassessing pt  [facility] will take patient             HEART Risk Score    Flowsheet Row Most Recent Value   History  1 Filed at: 03/06/2018 0126   ECG  1 Filed at: 03/06/2018 0126   Age  2 Filed at: 03/06/2018 0126   Risk Factors  1 Filed at: 03/06/2018 0126   Troponin  0 Filed at: 03/06/2018 0126   Heart Score Risk Calculator   History  1 Filed at: 03/06/2018 0126   ECG  1 Filed at: 03/06/2018 0126   Age  2 Filed at: 03/06/2018 0126   Risk Factors  1 Filed at: 03/06/2018 0126   Troponin  0 Filed at: 03/06/2018 0126   HEART Score  5 Filed at: 03/06/2018 0126   HEART Score  5 Filed at: 03/06/2018 0126                            MDM  Number of Diagnoses or Management Options  Diagnosis management comments: Differential diagnosis:   MI, NSTEMI, STEMI, viral illness, GERD, Bowel obstruction, gastroenteritis     EKG  Tele  CXR  Labs  IVF  CT C/A/P   Pain control  UA        Amount and/or Complexity of Data Reviewed  Clinical lab tests: ordered and reviewed  Tests in the radiology section of CPT®: ordered and reviewed  Review and summarize past medical records: yes  Discuss the patient with other providers: (Dr Tammie Lau )      CritCare Time    Disposition  Final diagnoses:   Chest pain, unspecified type   Nausea and vomiting, intractability of vomiting not specified, unspecified vomiting type   Generalized abdominal pain   Abnormal EKG   Pulmonary nodule   Hiatal hernia     Time reflects when diagnosis was documented in both MDM as applicable and the Disposition within this note     Time User Action Codes Description Comment    3/6/2018  1:45 AM Bell Mandes Add [R07 9] Chest pain, unspecified type     3/6/2018  1:45 AM Bell Mandes Add [R11 2] Nausea and vomiting, intractability of vomiting not specified, unspecified vomiting type     3/6/2018  1:45 AM Bell Mandes Add [R10 84] Generalized abdominal pain     3/6/2018  1:45 AM Bell Mandes Add [R94 31] Abnormal EKG     3/6/2018  1:46 AM Bell Mandes Add [R91 1] Pulmonary nodule     3/6/2018  1:46 AM Bell Mandes Add [K44 9] Hiatal hernia       ED Disposition     ED Disposition Condition Comment    Admit  Case was discussed with SOHSHANA and the patient's admission status was agreed to be Admission Status: observation status to the service of Dr Muna Torres     Follow-up Information    None       Patient's Medications   Discharge Prescriptions    No medications on file     No discharge procedures on file      ED Provider  Electronically Signed by           Faiza Barrett  03/06/18 7917

## 2018-03-06 NOTE — CASE MANAGEMENT
Initial Clinical Review    Admission: Date/Time/Statement:  OBS 3/6 @ 0149    Orders Placed This Encounter   Procedures    Place in Observation     Standing Status:   Standing     Number of Occurrences:   1     Order Specific Question:   Admitting Physician     Answer:   Matthew Renner [[id number]]     Order Specific Question:   Level of Care     Answer:   Med Surg [16]    Place in Observation (expected length of stay for this patient is less than two midnights)     Standing Status:   Standing     Number of Occurrences:   1     Order Specific Question:   Admitting Physician     Answer:   Matthew Renner [[id number]]     Order Specific Question:   Level of Care     Answer:   Med Surg [16]       ED: Date/Time/Mode of Arrival:   ED Arrival Information     Expected Arrival Acuity Means of Arrival Escorted By Service Admission Type    - 3/5/2018 21:42 Emergent Wheelchair Family Member General Medicine Emergency    Arrival Complaint    Vomiting        Chief Complaint:   Chief Complaint   Patient presents with    Vomiting     vomiting since yest, reports upper chest pain and sob  History of Illness:  [de-identified]year old [address] women who speaks no English and through family member states she started with nausea, vomiting, abdominal pain, chest pain last night  Pt has had intermittent diarrhea  She was taking zofran but stopped     Family member denies that pt has had any abdominal surgeries          ED Vital Signs:   ED Triage Vitals   Temperature Pulse Respirations Blood Pressure SpO2   03/05/18 2151 03/05/18 2151 03/05/18 2151 03/05/18 2151 03/05/18 2151   98 2 °F (36 8 °C) (!) 122 (!) 24 140/65 98 %      Temp Source Heart Rate Source Patient Position - Orthostatic VS BP Location FiO2 (%)   03/05/18 2151 03/05/18 2242 03/05/18 2151 03/05/18 2151 --   Temporal Monitor Lying Left arm       Pain Score       03/05/18 2151       Worst Possible Pain        Wt Readings from Last 1 Encounters:   No data found for Wt       Abnormal Labs/Diagnostic Test Results:  Wbc's 10 55,   Plats 541,   Glu 190  CXR: Cardiomegaly   Low lung volumes   No focal infiltrate or pleural effusion  CT Chest/abd/pelvis:  1   Moderate to large hiatal hernia   No bowel obstruction, colitis or diverticulitis  2   Stone filled gallbladder without evidence of acute cholecystitis   No biliary obstruction  3   Pulmonary nodules in the right middle lobe measuring 2 and 3 5 cm  EKG: Sinus Tach  Right bundle branch block  Left anterior fascicular block      ED Treatment:   Medication Administration from 03/05/2018 2142 to 03/06/2018 0449       Date/Time Order Dose Route Action Action by Comments     03/05/2018 2231 sodium chloride 0 9 % infusion 125 mL/hr Intravenous [address] Renate Fernandez RN      03/05/2018 2229 ondansetron (ZOFRAN) injection 4 mg 4 mg Intravenous Given Renate Fernandez RN      03/05/2018 2257 morphine injection 2 mg 2 mg Intravenous Given Renate Fernandez RN      03/06/2018 0107 pantoprazole (PROTONIX) injection 40 mg 40 mg Intravenous Given Renate Fernandez RN      03/06/2018 0220 famotidine (PEPCID) injection 20 mg 20 mg Intravenous Given Renate Fernandez RN           Past Medical/Surgical History:    Active Ambulatory Problems     Diagnosis Date Noted    No Active Ambulatory Problems     Resolved Ambulatory Problems     Diagnosis Date Noted    No Resolved Ambulatory Problems     Past Medical History:   Diagnosis Date    GERD (gastroesophageal reflux disease)     Hypertension        Admitting Diagnosis: Hiatal hernia [K44 9]  Vomiting [R11 10]  Generalized abdominal pain [R10 84]  Abnormal EKG [R94 31]  Pulmonary nodule [R91 1]  Chest pain, unspecified type [R07 9]  Nausea and vomiting, intractability of vomiting not specified, unspecified vomiting type [R11 2]    Age/Sex: [de-identified] y o  female    Assessment/Plan:   Principal Problem:    Intractable vomiting  Active Problems:    Chest pain     Plan for the Primary Problem(s):  Intractable vomiting  Patient with epigastric tenderness suggesting gastritis  Will keep patient on clear liquid diet  Will start her on IV Pepcid and p r n  IV Zofran  Continue Protonix  EKG shows sinus rhythm with bifascicular block  Will consult GI to evaluate for possible EGD     Chest pain  Will keep patient tele monitor  Trend troponins  Check 2D echo  Start patient on aspirin empirically  Cardiology consultation for further recommendations  Hypertension  Will continue Norvasc     VTE Prophylaxis: Enoxaparin (Lovenox)  / sequential compression device   Code Status: fc  POLST: POLST form is not discussed and not completed at this time      Anticipated Length of Stay:  Patient will be admitted on an Observation basis with an anticipated length of stay of  <2 midnights  Justification for Hospital Stay:  Intractable vomiting and workup for chest pain    Admission Orders:  OBS  TELE    Cont trops x 3  ECHO  Consult Cardio  Consult GI  Clear liquids  SCD's    Scheduled Meds:   Current Facility-Administered Medications:  acetaminophen 650 mg Oral Q6H PRN Irineo Harrell MD    amLODIPine 10 mg Oral Daily Irineo Harrell MD    aspirin 81 mg Oral Daily Irineo Harrell MD    enoxaparin 40 mg Subcutaneous Daily Irineo Harrell MD    famotidine 20 mg Intravenous Q12H [address] Irineo Harrell MD    ondansetron 4 mg Intravenous Q4H PRN Irineo Harrell MD    polyethylene glycol 17 g Oral Daily Irineo Harrell MD    sodium chloride 125 mL/hr Intravenous Continuous JACK Matos Last Rate: 125 mL/hr (03/05/18 2231)     Continuous Infusions:   sodium chloride 125 mL/hr Last Rate: 125 mL/hr (03/05/18 2231)     PRN Meds:   acetaminophen    ondansetron  trops neg    Thank you,  [address] [place] [address] in the [facility] by Chadwick Fitzpatrick for 2017  Network Utilization Review Department  Phone: [phone];  Fax [phone]  ATTENTION: The Network Utilization Review Department is now centralized for our 7 Facilities  Make a note that we have a new phone and fax numbers for our Department  Please call with any questions or concerns to [phone] and carefully follow the prompts so that you are directed to the right person  All voicemails are confidential  Fax any determinations, approvals, denials, and requests for initial or continue stay review clinical to [phone]  Due to HIGH CALL volume, it would be easier if you could please send faxed requests to expedite your requests and in part, help us provide discharge notifications faster

## 2018-03-06 NOTE — TREATMENT PLAN
Ultrasound report pending  Echocardiogram pending  I attempted to contact the patient's family number listed, no answer

## 2018-03-06 NOTE — ED ATTENDING ATTESTATION
Meena Parson MD, saw and evaluated the patient  I have discussed the patient with the resident/non-physician practitioner and agree with the resident's/non-physician practitioner's findings, Plan of Care, and MDM as documented in the resident's/non-physician practitioner's note, except where noted  All available labs and Radiology studies were reviewed  At this point I agree with the current assessment done in the Emergency Department  I have conducted an independent evaluation of this patient a history and physical is as follows:  [de-identified] yo female c/o chest pain, mid epigastric and substernal, since last night, (24 hours), associated with nausea, vomiting  I was involved in the case after her son who was the most helpful  for [facility] had gone home, but even now she continues to clutch her chest and c/o pain  VS have normalized after ED workup   EKG on arrival c/w RBBB, LBBB, without comparison  ED workup was appropriate including troponin, CT chest, which revealed hiatal hernia  Abd soft, seems to localize tenderness to epigastric area  Lipase negative  At this point, the consideration is this is more GI in origin on the continuum of gastritis/PUD, but I agree with admission to complete serial troponins, and to confirm that she can tolerate po without additional complications          Critical Care Time  CritCare Time    Procedures

## 2018-03-06 NOTE — ED NOTES
Patient transported to [address] [place] [address] Yamila, DEVANG  03/06/18 3911

## 2018-03-06 NOTE — ED NOTES
Spoke to Dr Cristin Armenta about Troponin order, made aware a troponin just resulted at 0124, per Dr Cristin Armenta don't do the "Now Troponin" , do the next troponin due in 3 hours        Kayla Denver, RN  03/06/18 0286

## 2018-03-06 NOTE — CONSULTS
Consultation - [facility]  Claudia Roth [de-identified] y o  female MRN: [id number]  Unit/Bed#: [place] [facility] 2 -01 Encounter: [id number]        ASSESSMENT/PLAN:   1  Nausea/vomiting/epigastric pain   - she reports acute onset of nausea, vomiting and epigastric pain which have improved today  She continues to have some epigastric tenderness  She reports pain all over her abdomen, chest and arms  She is unclear regarding RUQ tenderness  - her daughter reports that she had a similar episode in the past and was diagnosed with gallstone disease however surgery was not pursued at that time  -  Her liver function tests were within normal limits, she is afebrile without leukocytosis and her abdominal exam is benign    -  Her history is not consistent passed stone however gallstones were noted on abdominal CT, no evidence of cholecystitis or biliary dilatation was noted  -  Ordered right upper quadrant ultrasound to better assess her gallbladder    -  If this is unremarkable, consider upper endoscopy to assess for peptic ulcer disease  Her hemoglobin is stable and she does not have signs of GI bleeding    -  Continue supportive care and clear liquid diet    Inpatient consult to gastroenterology  Consult performed by: Rupinder Waite  Consult ordered by: Ricky Ruiz          Reason for Consult / Principal Problem: Intractable vomiting    HPI: Claudia Roth is a [de-identified]y o  year old female from [place] presents to the ED for abdominal pain and vomiting  Her daughter is with her and helps to provide some of the history  She reports that her symptoms began acutely couple of days ago with nausea, vomiting and epigastric tenderness  She denies any fevers, chills, difficulty swallowing, early satiety, change in bowel habits, unintended weight loss, hematochezia, melena, hematemesis or jaundice    Her daughter reports that a similar episode happened in the past many years ago and at that time she was diagnosed with gallstone disease and was recommended to see a surgeon  However her symptoms improved and they decided against surgery  Today she is feeling improved and has not had any further vomiting  Her daughter states that she has never had a colonoscopy or upper endoscopy  Review of Systems: as per HPI  Review of Systems   Constitutional: Negative for activity change, appetite change, chills, fatigue, fever and unexpected weight change  HENT: Negative for mouth sores, sore throat and trouble swallowing  Respiratory: Negative for shortness of breath  Cardiovascular: Negative for chest pain  Gastrointestinal: Positive for abdominal pain, nausea and vomiting  Negative for abdominal distention, blood in stool, constipation and diarrhea  Skin: Negative for color change, pallor, rash and wound  Neurological: Negative for tremors and syncope  All other systems reviewed and are negative  Historical Information   Past Medical History:   Diagnosis Date    GERD (gastroesophageal reflux disease)     Hypertension      Past Surgical History:   Procedure Laterality Date    NO PAST SURGERIES       Social History   History   Alcohol Use No     History   Drug Use No     History   Smoking Status    Never Smoker   Smokeless Tobacco    Never Used     History reviewed  No pertinent family history      Meds/Allergies     Prescriptions Prior to Admission   Medication    amLODIPine (NORVASC) 10 mg tablet    cimetidine (TAGAMET) 200 mg tablet    losartan (COZAAR) 50 mg tablet    omeprazole (PriLOSEC) 20 mg delayed release capsule    pantoprazole (PROTONIX) 40 mg tablet     Current Facility-Administered Medications   Medication Dose Route Frequency    acetaminophen (TYLENOL) tablet 650 mg  650 mg Oral Q6H PRN    amLODIPine (NORVASC) tablet 10 mg  10 mg Oral Daily    aspirin (ECOTRIN LOW STRENGTH) EC tablet 81 mg  81 mg Oral Daily    enoxaparin (LOVENOX) subcutaneous injection 40 mg  40 mg Subcutaneous Daily    famotidine (PEPCID) injection 20 mg  20 mg Intravenous Q12H [facility]    ondansetron (ZOFRAN) injection 4 mg  4 mg Intravenous Q4H PRN    polyethylene glycol (MIRALAX) packet 17 g  17 g Oral Daily    sodium chloride 0 9 % infusion  125 mL/hr Intravenous Continuous       No Known Allergies    Objective     Blood pressure 118/64, pulse 75, temperature 98 6 °F (37 °C), temperature source Tympanic, resp  rate 19, SpO2 97 %  No intake or output data in the 24 hours ending 03/06/18 1151    PHYSICAL EXAM     Physical Exam   Constitutional: She is oriented to person, place, and time  She is cooperative  No distress  Obese   HENT:   Head: Normocephalic and atraumatic  Eyes: Right eye exhibits no discharge  Left eye exhibits no discharge  No scleral icterus  Neck: Neck supple  No tracheal deviation present  Cardiovascular: Normal rate, regular rhythm and normal heart sounds  Exam reveals no gallop and no friction rub  No murmur heard  Pulmonary/Chest: Effort normal and breath sounds normal  No respiratory distress  She has no wheezes  She has no rales  Abdominal: Soft  Bowel sounds are normal  She exhibits no distension and no mass  There is tenderness (epigastric)  There is no rebound and no guarding  Neurological: She is alert and oriented to person, place, and time  Skin: Skin is warm and dry  Psychiatric: She has a normal mood and affect         Lab Results:   CBC:   Lab Results   Component Value Date    WBC 10 55 (H) 03/05/2018    HGB 11 8 03/05/2018    HCT 36 3 03/05/2018    MCV 73 (L) 03/05/2018     (H) 03/05/2018    MCH 23 6 (L) 03/05/2018    MCHC 32 5 03/05/2018    RDW 18 3 (H) 03/05/2018    MPV 9 7 03/05/2018   ,   CMP:   Lab Results   Component Value Date     03/05/2018    K 3 6 03/05/2018     03/05/2018    CO2 28 03/05/2018    ANIONGAP 10 03/05/2018    BUN 13 03/05/2018    CREATININE 1 17 03/05/2018    GLUCOSE 190 (H) 03/05/2018    CALCIUM 8 8 03/05/2018    AST 21 03/05/2018    ALT 20 03/05/2018    ALKPHOS 85 03/05/2018    PROT 7 8 03/05/2018    BILITOT 0 41 03/05/2018    EGFR 44 03/05/2018   ,   Lipase:   Lab Results   Component Value Date    LIPASE 252 03/05/2018   ,  PT/INR:   Lab Results   Component Value Date    INR 0 98 03/05/2018   ,   Troponin:   Lab Results   Component Value Date    TROPONINI <0 02 03/06/2018   Imaging Studies: I have personally reviewed pertinent reports  CT CHEST, ABDOMEN AND PELVIS WITHOUT IV CONTRAST     INDICATION: Abdominal pain, nausea vomiting and diarrhea      COMPARISON: 3/5/2018      TECHNIQUE: CT examination of the chest, abdomen and pelvis was performed without intravenous contrast   Axial, sagittal, and coronal 2D reformatted images were created from the source data and submitted for interpretation       Radiation dose length product (DLP) for this visit:  1468 mGy-cm   This examination, like all CT scans performed in the [facility], was performed utilizing techniques to minimize radiation dose exposure, including the use of iterative   reconstruction and automated exposure control       Enteric contrast was administered       FINDINGS:     CHEST     LUNGS:  There is a 2 mm subpleural lesion in the right middle lobe, adjacent to the fissure on series 3 image 23  A 3 5 mm pulmonary nodule is present in the right middle lobe on series 3 image 28  There is no tracheal or endobronchial lesion      PLEURA:  Unremarkable      HEART/GREAT VESSELS:  Mild cardiomegaly  No thoracic aortic aneurysm      MEDIASTINUM AND JAN:  Moderate to large hiatal hernia  No lymphadenopathy      CHEST WALL AND LOWER NECK:   Unremarkable      ABDOMEN     LIVER/BILIARY TREE:  Unremarkable      GALLBLADDER:  Stones or gallbladder  No evidence of cholecystitis        SPLEEN:  Unremarkable      PANCREAS:  Unremarkable      ADRENAL GLANDS:  Unremarkable      KIDNEYS/URETERS:  No nephrolithiasis or obstructive uropathy      STOMACH AND BOWEL:  Fat-containing 2 cm periumbilical hernia  No bowel obstruction, colitis or diverticulitis      APPENDIX:  No findings to suggest appendicitis      ABDOMINOPELVIC CAVITY:  No ascites or free intraperitoneal air  No lymphadenopathy      VESSELS:  Unremarkable for patient's age      PELVIS     REPRODUCTIVE ORGANS:  Unremarkable for patient's age      URINARY BLADDER:  Unremarkable      ABDOMINAL WALL/INGUINAL REGIONS:  Unremarkable      OSSEOUS STRUCTURES:  No acute fracture or destructive osseous lesion      IMPRESSION:        1  Moderate to large hiatal hernia  No bowel obstruction, colitis or diverticulitis  2   Stone filled gallbladder without evidence of acute cholecystitis  No biliary obstruction  3   Pulmonary nodules in the right middle lobe measuring 2 and 3 5 cm Based on current Fleischner Society 2017 Guidelines on incidental pulmonary nodule, no routine follow-up is needed if the patient is considered low risk for lung cancer  If the   patient is considered high risk for lung cancer, 12 month follow-up non-contrast chest CT is recommended  Patient was seen and examined by Dr Rodo Cruz  All velasco medical decisions were made by Dr Rodo Cruz  Thank you for allowing us to participate in the care of this present patient  We will follow-up with you closely

## 2018-03-06 NOTE — TREATMENT PLAN
Patient seen examined,  phone used, patient overall appears to be better/patient tolerating clear liquids, case discussed with nursing staff, will update patient's family with plan of care, right upper quadrant ultrasound and echocardiogram are pending  Pulmonary nodule, pt is low risk, = no tobacco use history no follow up needed  Can likely be discharged after testing is completed, obviously if no significant abnormalities are noted

## 2018-03-06 NOTE — ED NOTES
Attempted to call pts son, Melanie Ramirez [phone] to update on plan of care, no answer       Lg Agarwal, RN  03/06/18 [address] Sergio, RN  03/06/18 3830

## 2018-03-07 ENCOUNTER — APPOINTMENT (OUTPATIENT)
Dept: NON INVASIVE DIAGNOSTICS | Facility: HOSPITAL | Age: 78
End: 2018-03-07

## 2018-03-07 PROCEDURE — 99225 PR SBSQ OBSERVATION CARE/DAY 25 MINUTES: CPT | Performed by: INTERNAL MEDICINE

## 2018-03-07 PROCEDURE — 93306 TTE W/DOPPLER COMPLETE: CPT

## 2018-03-07 PROCEDURE — 93306 TTE W/DOPPLER COMPLETE: CPT | Performed by: INTERNAL MEDICINE

## 2018-03-07 RX ORDER — SODIUM CHLORIDE 9 MG/ML
50 INJECTION, SOLUTION INTRAVENOUS ONCE
Status: COMPLETED | OUTPATIENT
Start: 2018-03-07 | End: 2018-03-08

## 2018-03-07 RX ADMIN — SODIUM CHLORIDE 50 ML/HR: 0.9 INJECTION, SOLUTION INTRAVENOUS at 13:11

## 2018-03-07 RX ADMIN — FAMOTIDINE 20 MG: 10 INJECTION, SOLUTION INTRAVENOUS at 09:10

## 2018-03-07 RX ADMIN — FAMOTIDINE 20 MG: 10 INJECTION, SOLUTION INTRAVENOUS at 21:05

## 2018-03-07 RX ADMIN — AMLODIPINE BESYLATE 10 MG: 10 TABLET ORAL at 09:10

## 2018-03-07 RX ADMIN — ENOXAPARIN SODIUM 40 MG: 40 INJECTION SUBCUTANEOUS at 09:10

## 2018-03-07 NOTE — PROGRESS NOTES
[facility] Progress Note  Patient: Ira Lees [de-identified] y o  female   MRN: [id number]  PCP: No primary care provider on file  Unit/Bed#: [place] 2 -01 Encounter: [id number]  Date Of Visit: 18    Assessment:    Principal Problem:    Intractable vomiting  Active Problems:    Chest pain      Plan:  · Intractable vomiting  Likely secondary to gastritis versus viral gastroenteritis  Resolved  Tolerating Resolved  Tolerating diet  Advance as tolerated  Follow up on right upper quadrant ultrasound result  Continue with PPI and p r n  antiemetics  · Chest pain  Acute coronary syndrome ruled out  Follow up on echocardiogram   · Hypertension  Stable on Norvasc  VTE Pharmacologic Prophylaxis:   Pharmacologic: Enoxaparin (Lovenox)  Mechanical VTE Prophylaxis in Place: No    Patient Centered Rounds: I have performed bedside rounds with nursing staff today  Discussions with Specialists or Other Care Team Provider:  Case management    Education and Discussions with Family / Patient:  Attempted to reach patient's daughter over the phone  Message left    Time Spent for Care: 30 minutes  More than 50% of total time spent on counseling and coordination of care as described above  Current Length of Stay: 0 day(s)    Current Patient Status: Observation   Certification Statement: The patient will continue to require additional inpatient hospital stay due to Await results of echocardiogram and ultrasound  Discharge Plan / Estimated Discharge Date:  Likely in the next 24 hours  Code Status: Level 1 - Full Code      Subjective:   Patient denies any further episode of chest pain or abdominal pain  Tolerating diet without  Nausea ,vomiting   phone used    Objective:     Vitals:   Temp (24hrs), Av 5 °F (36 9 °C), Min:98 2 °F (36 8 °C), Max:98 9 °F (37 2 °C)    HR:  [66-79] 79  Resp:  [20] 20  BP: (121-132)/(66-74) 127/66  SpO2:  [97 %-100 %] 100 %  There is no height or weight on file to calculate BMI  Input and Output Summary (last 24 hours): Intake/Output Summary (Last 24 hours) at 03/07/18 1152  Last data filed at 03/06/18 2127   Gross per 24 hour   Intake              980 ml   Output                0 ml   Net              980 ml       Physical Exam:     Physical Exam   Constitutional: She is oriented to person, place, and time  No distress  HENT:   Head: Normocephalic and atraumatic  Eyes: Conjunctivae are normal  Pupils are equal, round, and reactive to light  Neck: Normal range of motion  Neck supple  Cardiovascular: Normal rate and regular rhythm  Pulmonary/Chest: Effort normal and breath sounds normal    Abdominal: Soft  Bowel sounds are normal  She exhibits no distension  There is no tenderness  There is no rebound  Musculoskeletal: She exhibits no edema  Neurological: She is alert and oriented to person, place, and time  Skin: Skin is warm  She is not diaphoretic  Psychiatric: She has a normal mood and affect  Additional Data:     Labs:      Results from last 7 days  Lab Units 03/06/18  1504   WBC Thousand/uL 7 64   HEMOGLOBIN g/dL 10 7*   HEMATOCRIT % 33 8*   PLATELETS Thousands/uL 424*   NEUTROS PCT % 48   LYMPHS PCT % 41   MONOS PCT % 8   EOS PCT % 3       Results from last 7 days  Lab Units 03/06/18  1504   SODIUM mmol/L 142   POTASSIUM mmol/L 3 7   CHLORIDE mmol/L 107   CO2 mmol/L 24   BUN mg/dL 7   CREATININE mg/dL 1 06   CALCIUM mg/dL 8 0*   TOTAL PROTEIN g/dL 6 6   BILIRUBIN TOTAL mg/dL 0 68   ALK PHOS U/L 73   ALT U/L 17   AST U/L 16   GLUCOSE RANDOM mg/dL 156*       Results from last 7 days  Lab Units 03/05/18  2204   INR  0 98       * I Have Reviewed All Lab Data Listed Above  * Additional Pertinent Lab Tests Reviewed: All Labs Within Last 24 Hours Reviewed    Imaging:    Imaging Reports Reviewed Today Include: NA    Await right upper quadrant ultrasound /echocardiogram results      Recent Cultures (last 7 days): Last 24 Hours Medication List:     Current Facility-Administered Medications:  acetaminophen 650 mg Oral Q6H PRN Irineo Harrell MD    amLODIPine 10 mg Oral Daily Irineo Harrell MD    enoxaparin 40 mg Subcutaneous Daily Irineo Harrell MD    famotidine 20 mg Intravenous Q12H [address] Irineo Harrell MD    ondansetron 4 mg Intravenous Q4H PRN Irineo Harrell MD    polyethylene glycol 17 g Oral Daily Irineo Harrell MD    sodium chloride 75 mL/hr Intravenous Continuous Tonie Tamayo DO Last Rate: 75 mL/hr (03/06/18 2127)        Today, Patient Was Seen By: Kimi Austin MD    ** Please Note: This note has been constructed using a voice recognition system   **

## 2018-03-08 VITALS
TEMPERATURE: 98.4 F | OXYGEN SATURATION: 99 % | SYSTOLIC BLOOD PRESSURE: 120 MMHG | HEART RATE: 85 BPM | DIASTOLIC BLOOD PRESSURE: 66 MMHG | RESPIRATION RATE: 20 BRPM

## 2018-03-08 PROBLEM — R07.9 CHEST PAIN: Status: RESOLVED | Noted: 2018-03-06 | Resolved: 2018-03-08

## 2018-03-08 PROBLEM — R91.1 PULMONARY NODULE: Status: ACTIVE | Noted: 2018-03-08

## 2018-03-08 PROBLEM — R11.10 INTRACTABLE VOMITING: Status: RESOLVED | Noted: 2018-03-06 | Resolved: 2018-03-08

## 2018-03-08 PROBLEM — K80.20 CALCULUS OF GALLBLADDER: Status: ACTIVE | Noted: 2018-03-08

## 2018-03-08 PROBLEM — K44.9 HIATAL HERNIA: Status: ACTIVE | Noted: 2018-03-08

## 2018-03-08 PROCEDURE — 99217 PR OBSERVATION CARE DISCHARGE MANAGEMENT: CPT | Performed by: INTERNAL MEDICINE

## 2018-03-08 PROCEDURE — 99232 SBSQ HOSP IP/OBS MODERATE 35: CPT | Performed by: PHYSICIAN ASSISTANT

## 2018-03-08 RX ORDER — CALCIUM CARBONATE 200(500)MG
500 TABLET,CHEWABLE ORAL DAILY PRN
Status: DISCONTINUED | OUTPATIENT
Start: 2018-03-08 | End: 2018-03-08 | Stop reason: HOSPADM

## 2018-03-08 RX ORDER — CALCIUM CARBONATE 200(500)MG
500 TABLET,CHEWABLE ORAL DAILY PRN
Qty: 30 TABLET | Refills: 0 | Status: SHIPPED | OUTPATIENT
Start: 2018-03-08

## 2018-03-08 RX ADMIN — AMLODIPINE BESYLATE 10 MG: 10 TABLET ORAL at 08:44

## 2018-03-08 RX ADMIN — ENOXAPARIN SODIUM 40 MG: 40 INJECTION SUBCUTANEOUS at 08:45

## 2018-03-08 RX ADMIN — FAMOTIDINE 20 MG: 10 INJECTION, SOLUTION INTRAVENOUS at 08:45

## 2018-03-08 RX ADMIN — POLYETHYLENE GLYCOL 3350 17 G: 17 POWDER, FOR SOLUTION ORAL at 08:44

## 2018-03-08 RX ADMIN — Medication 500 MG: at 11:32

## 2018-03-08 RX ADMIN — Medication 500 MG: at 15:26

## 2018-03-08 RX ADMIN — ONDANSETRON 4 MG: 2 INJECTION INTRAMUSCULAR; INTRAVENOUS at 14:45

## 2018-03-08 NOTE — CASE MANAGEMENT
Continued Stay Review    Date:  3/7/2018    Vital Signs: 98 2 - 81 - 19   123/60   RA 99%    Medications:   Scheduled Meds:   Current Facility-Administered Medications:  acetaminophen 650 mg Oral Q6H PRN Irineo Harrell MD   amLODIPine 10 mg Oral Daily Irineo Harrell MD   enoxaparin 40 mg Subcutaneous Daily Irineo Harrell MD   famotidine 20 mg Intravenous Q12H [address] Irineo Harrell MD   ondansetron 4 mg Intravenous Q4H PRN Irineo Harrell MD   polyethylene glycol 17 g Oral Daily Irineo Harrell MD     Continuous Infusions:    PRN Meds:   acetaminophen    ondansetron    Abnormal Labs/Diagnostic Results:  No labs    Age/Sex: [de-identified] y o  female     Assessment/Plan: Principal Problem:    Intractable vomiting  Active Problems:    Chest pain        Plan:  · Intractable vomiting  Likely secondary to gastritis versus viral gastroenteritis  Resolved  Tolerating Resolved  Tolerating diet  Advance as tolerated  Follow up on right upper quadrant ultrasound result  Continue with PPI and p r n  antiemetics  · Chest pain  Acute coronary syndrome ruled out  Follow up on echocardiogram   Hypertension  Stable on Norvasc  Certification Statement: The patient will continue to require additional inpatient hospital stay due to Await results of echocardiogram and ultrasound      Discharge Plan / Estimated Discharge Date:  Likely in the next 24 hours  Thank you,  [address] [facility] in the [facility] by [facility] [employer] for 2017  Network Utilization Review Department  Phone: [phone]; Fax [phone]  ATTENTION: The Network Utilization Review Department is now centralized for our 7 Facilities  Make a note that we have a new phone and fax numbers for our Department  Please call with any questions or concerns to [phone] and carefully follow the prompts so that you are directed to the right person   All voicemails are confidential  Fax any determinations, approvals, denials, and requests for initial or continue stay review clinical to [phone]  Due to HIGH CALL volume, it would be easier if you could please send faxed requests to expedite your requests and in part, help us provide discharge notifications faster

## 2018-03-08 NOTE — DISCHARGE SUMMARY
Discharge Summary - [facility] Internal Medicine    Patient Information: Lilia Jovel [de-identified] y o  female MRN: [id number]  Unit/Bed#: [address] [place] [address] 208-01 Encounter: [id number]    Discharging Physician / Practitioner: Sheryle Malone, MD  PCP: No primary care provider on file  Admission Date: 3/5/2018  Discharge Date: 03/08/18    Disposition:     Home    Reason for Admission:  Chest pain    Discharge Diagnoses:     Principal Problem:    Calculus of gallbladder  Active Problems:    Hiatal hernia    Pulmonary nodule  Resolved Problems:    Chest pain    Intractable vomiting      Consultations During Hospital Stay:  · Cardiology  · Gastroenterology    Procedures Performed:     · Right upper quadrant ultrasound cholelithiasis without cholecystitis  · Had 2D echocardiogram   Negative for any valvular dysfunction with normal systolic function  Incidental finding:  Right middle lobe pulmonary nodules  Hospital Course:     Lilia Jovel is a [de-identified] y o  female patient who originally presented to the hospital on 3/5/2018 due to chest pain epigastric pain and intractable vomiting  She was initially admitted on telemetry monitor serial cardiac enzymes were done which were negative for acute coronary syndrome  She was evaluated by Cardiology and a 2D echocardiogram was done which was also negative  Gastroenterology evaluated the patient and recommended upper right quadrant ultrasound  She was found to have gallstones without evidence of cholecystitis  Her LFTs were within normal limits  She was maintained on proton pump inhibitor H2 blockers along with antacids  Her symptoms improved and she was deemed stable for discharge home  It was discussed with the daughter the need for elective cholecystectomy should her symptoms of dyspepsia persist   Condition at Discharge: stable     Discharge Day Visit / Exam:     Subjective:  Patient examined at bedside  Complains of mild dyspepsia    Denies any chest pain or abdominal discomfort  Vitals: Blood Pressure: 142/70 (03/08/18 0750)  Pulse: 92 (03/08/18 0750)  Temperature: 99 1 °F (37 3 °C) (03/08/18 0750)  Temp Source: Tympanic (03/08/18 0750)  Respirations: 19 (03/08/18 0750)  SpO2: 100 % (03/08/18 0750)  Exam:   Physical Exam   Constitutional: She is oriented to person, place, and time  No distress  HENT:   Head: Normocephalic and atraumatic  Eyes: Conjunctivae are normal  Pupils are equal, round, and reactive to light  Neck: Neck supple  Cardiovascular: Normal rate and regular rhythm  Pulmonary/Chest: Effort normal and breath sounds normal    Abdominal: Soft  Bowel sounds are normal  She exhibits no distension and no mass  There is no tenderness  There is no guarding  Musculoskeletal: She exhibits no edema  Neurological: She is alert and oriented to person, place, and time  Skin: Skin is warm  She is not diaphoretic  Psychiatric: She has a normal mood and affect  Discussion with Family:  Message left for daughter    Discharge instructions/Information to patient and family:   See after visit summary for information provided to patient and family  Provisions for Follow-Up Care:  See after visit summary for information related to follow-up care and any pertinent home health orders  Planned Readmission:   No     Discharge Statement:  I spent 35 minutes discharging the patient  This time was spent on the day of discharge  I had direct contact with the patient on the day of discharge  Greater than 50% of the total time was spent examining patient, answering all patient questions, arranging and discussing plan of care with patient as well as directly providing post-discharge instructions  Additional time then spent on discharge activities  Discharge Medications:  See after visit summary for reconciled discharge medications provided to patient and family        ** Please Note: This note has been constructed using a voice recognition system **

## 2018-03-08 NOTE — PROGRESS NOTES
[facility]  Progress Note - Kin Sanchez [de-identified] y o  female MRN: [id number]    Unit/Bed#: [address] [place] [address] 208-01 Encounter: [id number]    Assessment/Plan:  1  Nausea/vomiting/epigastric pain              - she reports that she was feeling much improved until she ate breakfast when she had recurrence of the right upper quadrant pain  She admits to nausea but denies any vomiting               - her daughter reports that she had a similar episode in the past and was diagnosed with gallstone disease however surgery was not pursued at that time  -  Her liver function tests were within normal limits, she is afebrile without leukocytosis and her abdominal exam is benign               -   ultrasound showed innumerable gallstones filling the gallbladder but no evidence of choledocholithiasis or acute cholecystitis  -  Continue Protonix   - Consider EGD, if this is negative could consider referral to general surgery for elective cholecystectomy  Subjective:   Patient seen examined at bedside  Blue phone was used for translation  She reports that she was feeling quite well until she ate breakfast and then she developed right upper quadrant pain shortly after eating  She denies any pain prior to eating  She admits to some nausea but denies any vomiting  Objective:     Vitals: Blood pressure 142/70, pulse 92, temperature 99 1 °F (37 3 °C), temperature source Tympanic, resp  rate 19, SpO2 100 %  ,There is no height or weight on file to calculate BMI  Intake/Output Summary (Last 24 hours) at 03/08/18 1246  Last data filed at 03/08/18 0150   Gross per 24 hour   Intake              600 ml   Output                0 ml   Net              600 ml       Review of Systems: as per HPI  Review of Systems   Constitutional: Negative for activity change, appetite change, chills, fatigue, fever and unexpected weight change     HENT: Negative for mouth sores, sore throat and trouble swallowing  Respiratory: Negative for shortness of breath  Cardiovascular: Negative for chest pain  Gastrointestinal: Positive for abdominal pain  Negative for abdominal distention, blood in stool, constipation, diarrhea, nausea and vomiting  Skin: Negative for color change, pallor, rash and wound  Neurological: Negative for tremors and syncope  All other systems reviewed and are negative  Physical Exam:     Physical Exam   Constitutional: She is oriented to person, place, and time  She appears well-developed and well-nourished  No distress  Obese   HENT:   Head: Normocephalic and atraumatic  Eyes: Right eye exhibits no discharge  Left eye exhibits no discharge  No scleral icterus  Neck: Neck supple  No tracheal deviation present  Cardiovascular: Normal rate, regular rhythm and normal heart sounds  Exam reveals no gallop and no friction rub  No murmur heard  Pulmonary/Chest: Effort normal and breath sounds normal  No respiratory distress  She has no wheezes  She has no rales  Abdominal: Soft  Bowel sounds are normal  She exhibits no distension and no mass  There is no tenderness  There is no rebound and no guarding  Neurological: She is alert and oriented to person, place, and time  Skin: Skin is warm and dry  Psychiatric: She has a normal mood and affect  Invasive Devices          No matching active lines, drains, or airways      Imaging Studies: I have personally reviewed pertinent reports  RIGHT UPPER QUADRANT ULTRASOUND     INDICATION:     RUQ pain     Evaluate for cholecystitis      COMPARISON:  None      TECHNIQUE:   Real-time ultrasound of the right upper quadrant was performed with a curvilinear transducer with both volumetric sweeps and still imaging techniques      FINDINGS:     PANCREAS:  Visualized portions of the pancreas are within normal limits      AORTA AND IVC:  Visualized portions are normal for patient age      LIVER:  Size:  Within normal range   The liver measures 16 1 cm in the midclavicular line  Contour:  Surface contour is smooth  Parenchyma:  Echogenicity and echotexture are within normal limits  No evidence of suspicious mass  Limited imaging of the main portal vein shows it to be patent and hepatopetal      BILIARY:  The gallbladder is filled with gallstones  Gallbladder wall is normal in thickness at 2 mm  No intrahepatic biliary dilatation  Negative Anderson's sign  CBD measures 4 mm  No choledocholithiasis      KIDNEY:   Right kidney measures 9 5 x 4 0 cm  Within normal limits      ASCITES:   None      IMPRESSION:     Innumerable gallstones filling the gallbladder  No evidence of choledocholithiasis  No intrahepatic or extrahepatic biliary dilatation  Negative Anderson's sign

## 2018-03-09 ENCOUNTER — TELEPHONE (OUTPATIENT)
Dept: GASTROENTEROLOGY | Facility: MEDICAL CENTER | Age: 78
End: 2018-03-09

## 2018-03-09 NOTE — TELEPHONE ENCOUNTER
----- Message from Denisha Obregon PA-C sent at 3/9/2018  8:24 AM EST -----  Please call pt for follow up in the office in the next few months (recent hospitalization), thank you!

## 2018-03-14 ENCOUNTER — TELEPHONE (OUTPATIENT)
Dept: GASTROENTEROLOGY | Facility: MEDICAL CENTER | Age: 78
End: 2018-03-14

## 2018-03-22 ENCOUNTER — TELEPHONE (OUTPATIENT)
Dept: GASTROENTEROLOGY | Facility: MEDICAL CENTER | Age: 78
End: 2018-03-22

## 2018-03-22 NOTE — TELEPHONE ENCOUNTER
----- Message from Hal Gottlieb PA-C sent at 3/9/2018  8:24 AM EST -----  Please call pt for follow up in the office in the next few months (recent hospitalization), thank you!